# Patient Record
Sex: FEMALE | Race: WHITE | NOT HISPANIC OR LATINO | URBAN - METROPOLITAN AREA
[De-identification: names, ages, dates, MRNs, and addresses within clinical notes are randomized per-mention and may not be internally consistent; named-entity substitution may affect disease eponyms.]

---

## 2024-05-11 ENCOUNTER — EMERGENCY (EMERGENCY)
Facility: HOSPITAL | Age: 64
LOS: 1 days | Discharge: HOME | End: 2024-05-11
Attending: STUDENT IN AN ORGANIZED HEALTH CARE EDUCATION/TRAINING PROGRAM | Admitting: STUDENT IN AN ORGANIZED HEALTH CARE EDUCATION/TRAINING PROGRAM
Payer: COMMERCIAL

## 2024-05-11 VITALS
HEART RATE: 80 BPM | SYSTOLIC BLOOD PRESSURE: 150 MMHG | RESPIRATION RATE: 16 BRPM | DIASTOLIC BLOOD PRESSURE: 80 MMHG | TEMPERATURE: 98 F | OXYGEN SATURATION: 99 %

## 2024-05-11 VITALS
OXYGEN SATURATION: 100 % | HEART RATE: 84 BPM | TEMPERATURE: 98 F | WEIGHT: 127.87 LBS | SYSTOLIC BLOOD PRESSURE: 159 MMHG | DIASTOLIC BLOOD PRESSURE: 87 MMHG | HEIGHT: 65.75 IN | RESPIRATION RATE: 17 BRPM

## 2024-05-11 DIAGNOSIS — S52.501A UNSPECIFIED FRACTURE OF THE LOWER END OF RIGHT RADIUS, INITIAL ENCOUNTER FOR CLOSED FRACTURE: ICD-10-CM

## 2024-05-11 DIAGNOSIS — G89.11 ACUTE PAIN DUE TO TRAUMA: ICD-10-CM

## 2024-05-11 DIAGNOSIS — Z91.09 OTHER ALLERGY STATUS, OTHER THAN TO DRUGS AND BIOLOGICAL SUBSTANCES: ICD-10-CM
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
## 2024-05-11 DIAGNOSIS — M25.531 PAIN IN RIGHT WRIST: ICD-10-CM
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
## 2024-05-11 DIAGNOSIS — M79.671 PAIN IN RIGHT FOOT: ICD-10-CM

## 2024-05-11 PROCEDURE — 73200 CT UPPER EXTREMITY W/O DYE: CPT | Mod: MC

## 2024-05-11 PROCEDURE — 73110 X-RAY EXAM OF WRIST: CPT | Mod: 26,RT

## 2024-05-11 PROCEDURE — 25605 CLTX DST RDL FX/EPHYS SEP W/: CPT | Mod: RT

## 2024-05-11 PROCEDURE — 99285 EMERGENCY DEPT VISIT HI MDM: CPT

## 2024-05-11 PROCEDURE — 73110 X-RAY EXAM OF WRIST: CPT

## 2024-05-11 PROCEDURE — 73090 X-RAY EXAM OF FOREARM: CPT | Mod: 26,RT

## 2024-05-11 PROCEDURE — 73130 X-RAY EXAM OF HAND: CPT

## 2024-05-11 PROCEDURE — 73090 X-RAY EXAM OF FOREARM: CPT

## 2024-05-11 PROCEDURE — 73200 CT UPPER EXTREMITY W/O DYE: CPT | Mod: 26,RT,MC

## 2024-05-11 PROCEDURE — 99285 EMERGENCY DEPT VISIT HI MDM: CPT | Mod: 25

## 2024-05-11 PROCEDURE — 73630 X-RAY EXAM OF FOOT: CPT

## 2024-05-11 PROCEDURE — 73130 X-RAY EXAM OF HAND: CPT | Mod: 26,RT

## 2024-05-11 PROCEDURE — 73630 X-RAY EXAM OF FOOT: CPT | Mod: 26,RT

## 2024-05-11 NOTE — ED PROVIDER NOTE - CLINICAL SUMMARY MEDICAL DECISION MAKING FREE TEXT BOX
63-year-old female presenting with right wrist injury, neurovascularly intact, minimal tenderness.  Concern for distal radius fracture, also mild right foot pain with mild tenderness.  Will obtain x-rays rule out fracture.  Offered analgesia, patient declines.

## 2024-05-11 NOTE — ED ADULT NURSE NOTE - NSFALLRISKINTERV_ED_ALL_ED

## 2024-05-11 NOTE — ED ADULT TRIAGE NOTE - CHIEF COMPLAINT QUOTE
Patient noted to have deformity to right wrist s/p slip and fall in bathtub. Patient denies head injury, blood thinners.

## 2024-05-11 NOTE — ED ADULT NURSE NOTE - OBJECTIVE STATEMENT
63 y.o. Female s/p mechanical slip and fall in bathtub this AM presents c/o R wrist pain, swelling and R lateral foot pain. Denies head strike, LOC, AC use. +radial pulse. Denies CP, SOB, recent fever/chills, numbness/tingling.

## 2024-05-11 NOTE — CONSULT NOTE ADULT - SUBJECTIVE AND OBJECTIVE BOX
Orthopaedic Surgery Consult Note    For Surgeon:    HPI:  63yFemale  Patient is a 63y old  Female who presents with a chief complaint of   HPI:      Allergies    No Known Drug Allergies  allergic to nickel (Rash)    Intolerances      PAST MEDICAL & SURGICAL HISTORY:    MEDICATIONS  (STANDING):    MEDICATIONS  (PRN):      Vital Signs Last 24 Hrs  T(C): 36.8 (11 May 2024 09:15), Max: 36.8 (11 May 2024 09:15)  T(F): 98.3 (11 May 2024 09:15), Max: 98.3 (11 May 2024 09:15)  HR: 84 (11 May 2024 09:15) (84 - 84)  BP: 159/87 (11 May 2024 09:15) (159/87 - 159/87)  BP(mean): --  RR: 17 (11 May 2024 09:15) (17 - 17)  SpO2: 100% (11 May 2024 09:15) (100% - 100%)    Parameters below as of 11 May 2024 09:15  Patient On (Oxygen Delivery Method): room air        Physical Exam:        Imaging:     A/P: 63yFemale    -Discussed with Dr. Reardon Pager [phone]   Orthopaedic Surgery Consult Note    For Surgeon: Dr. Suarez    HPI:  63F visiting from [place] p/w R wrist pain and R foot pain s/p fall earlier today. Able to bear weight on right lower extremity without pain. Denies any numbness / tingling / weakness. Denies other musculoskeletal injuries.       PAST MEDICAL & SURGICAL HISTORY:  n/a      Vital Signs Last 24 Hrs  T(C): 36.9 (11 May 2024 13:30), Max: 36.9 (11 May 2024 13:30)  T(F): 98.5 (11 May 2024 13:30), Max: 98.5 (11 May 2024 13:30)  HR: 80 (11 May 2024 13:30) (80 - 84)  BP: 150/80 (11 May 2024 13:30) (150/80 - 159/87)  BP(mean): --  RR: 16 (11 May 2024 13:30) (16 - 17)  SpO2: 99% (11 May 2024 13:30) (99% - 100%)    Parameters below as of 11 May 2024 13:30  Patient On (Oxygen Delivery Method): room air      Physical Exam:  Right upper extremity  Skin intact, +Swelling, +Ecchymosis  Decreased rom wrist 2/2 pain  Pulses intact, brisk cap refill  Sensation intact pin/ain/ulnar  Motor intact  Elbow NT full active rom w/o pain    Right lower extremity  No ttp  No visible deformity  Distal pulses intact  SILT      Imaging:   Xray Right wrist shows +distal radius fracture, dorsally displaced      Procedure:   Using aseptic technique injected 10cc 1% lidocaine w/o epi into Left / Right wrist performing hematoma block. Reduction performed, sugartong splint applied. Patient tolerated procedure well, neurovasc intact afterwards.      A/P: 63yFemale with Right distal radius fx + nondisplaced 5th metatarsal shaft fracture    -NWB RUE / sling  -WBAT RLE /  CAM boot  -Pain control  -ice/elevation  -Post splint xray reviewed  -f/u ortho surgeon in [place] in 1-2 weeks  -Discussed with Dr. Suarez    Ortho Pager [phone]

## 2024-05-11 NOTE — ED PROVIDER NOTE - PHYSICAL EXAMINATION
general: Well appearing, in no acute distress  HEENT: Normocephalic, atraumatic, extraocular movements intact  CV: Regular rate  Pulm: No respiratory distress, no tachypnea  Abd: Flat, no gross distension  Ext: warm and well perfused, hematoma along right distal radius with tenderness to palpation, 2+ pulse, sensation intact, also mild tenderness of right mid metatarsal, no swelling, full range of motion, 2+ pulse  Skin: No gross rashes or lesions  Neuro: Alert and oriented, moving all extremities

## 2024-05-11 NOTE — ED PROVIDER NOTE - PATIENT PORTAL LINK FT
You can access the FollowMyHealth Patient Portal offered by [facility] by registering at the following website: [email or web address]. By joining [facility]’s FollowMyHealth portal, you will also be able to view your health information using other applications (apps) compatible with our system.

## 2024-05-11 NOTE — ED PROVIDER NOTE - CARE PROVIDER_API CALL
Anjel Suarez  Orthopaedic Surgery  [address] [place] [address], [address]  [place] [address]  Phone: [phone]  Fax: [phone]  Follow Up Time: 4-6 Days

## 2024-05-11 NOTE — ED PROVIDER NOTE - OBJECTIVE STATEMENT
63-year-old female with history of thyroid disorder presenting with right wrist pain.  Patient had mechanical fall, fell onto right wrist, also hurt right foot.  No numbness or tingling.  No head trauma, no LOC.  Not on AC.  No other complaints.  ROS as above.

## 2024-05-11 NOTE — ED PROVIDER NOTE - NSFOLLOWUPINSTRUCTIONS_ED_ALL_ED_FT
Please take tylenol or motrin as needed. You may bear weight as tolerated on your right foot. Please do not bear any weight on your right arm. Please follow up with the orthopedic surgeon in [place] within two weeks. Return for any worsening symptoms, worsening pain.    I hope you feel better soon!    Sincerely,  Francisco Lopez MD

## 2024-11-06 NOTE — ED ADULT NURSE NOTE - SUICIDE SCREENING QUESTION 2
11/06/2024- PT WAS DISCHARGED HOME  
11/06/2024- PT WAS DISCHARGED HOME. CALLED PT AND LMOM CONFIRMING APT AT [facility]  
11/4/24 - PT IN [facility]  12/4/24 4 WK HFU W/LH NO IMAGING *[facility]*    Lexie Appiah PA-C  [facility]  Hi,  Can you please schedule this patient for an approximate 4-week hospital follow-up appointment with Dr. Rianna cabrera?  No new imaging required for this appointment.  Thank you,  Lexie  
No